# Patient Record
(demographics unavailable — no encounter records)

---

## 2025-03-14 NOTE — HISTORY OF PRESENT ILLNESS
[FreeTextEntry1] : 43 year old male with scrotal swelling  About 2 weeks ago noticed swelling on right side Feels like fluid around it Wife noticed it  Thinks the fluid moves when the scrotum contracts  Not painful. But maybe mild discomfort Not warm. No skin changes No fevers  Urination is fine No dysuria. No gross hematuria  PMH: none PSx: kidney stone s/p lithotripsy (about 8 years ago) Blood thinners: none

## 2025-03-14 NOTE — REASON FOR VISIT
[Initial Visit ___] : [unfilled] is here today for an initial visit  for [unfilled] [TextEntry] : Verbal consent given on 03/14/2025 at 09:22 by BENNY HUSAIN, ~  ~. The patient-doctor relationship has been established in a face to face fashion via real time video/audio HIPAA compliant communication using telemedicine software. He has requested care to be assessed and treated through telemedicine. He understands that there may be limitations in this process and that he may need further follow up care in the office and/or hospital setting.The patient-doctor relationship has been established in a face to face fashion via real time video/audio HIPAA compliant communication using telemedicine software. He has requested care to be assessed and treated through telemedicine. He understands that there may be limitations in this process and that he may need further follow up care in the office and/or hospital setting.

## 2025-03-14 NOTE — ASSESSMENT
[FreeTextEntry1] : 43 year old male with scrotal swelling  - Unable to examine patient due to telehealth - Discussed differential dx including hydrocele/spermatocele, infection, malignancy. Based on history, likely hydrocele or spermatocele - Will obtain scrotal ultrasound. Will call with results - Plan pending above

## 2025-03-25 NOTE — HISTORY OF PRESENT ILLNESS
[FreeTextEntry1] : 43 year old male with scrotal swelling   Scrotal US 3/20/25: bilateral hydroceles (large right, mild left)   Has 2 kids No more desire for fertility  PMH: none PSx: kidney stone s/p lithotripsy (about 8 years ago) Blood thinners: none

## 2025-03-25 NOTE — ASSESSMENT
[FreeTextEntry1] : 43 year old male with right hydrocele and desire for vasectomy  Scrotal US 3/20/25: bilateral hydroceles (large right, mild left)  - Discussed right hydrocelectomy vs observation. Risks including bleeding, infection discussed. Postop expectations discussed - He would likely want vasectomy at the same time. No further fertility desire  Pt understands risk - infection, bleeding, sperm granuloma, orchitis. Small risk of chronic orchalgia. Small risk of failure (either initially or future recanalization). Understands that he will not be able to have children via conventional methods following this. This procedure does not protect against STDs.  Discussed how patient is not considered sterile until a PVSA reveals no sperm. Until then he must use protection during sexual activity. All questions answered.   He is looking to schedule in the next 6-12 months. He will let me know RTC when ready